# Patient Record
Sex: FEMALE | URBAN - METROPOLITAN AREA
[De-identification: names, ages, dates, MRNs, and addresses within clinical notes are randomized per-mention and may not be internally consistent; named-entity substitution may affect disease eponyms.]

---

## 2021-06-18 ENCOUNTER — APPOINTMENT (RX ONLY)
Dept: URBAN - METROPOLITAN AREA CLINIC 17 | Facility: CLINIC | Age: 31
Setting detail: DERMATOLOGY
End: 2021-06-18

## 2021-06-18 DIAGNOSIS — D22 MELANOCYTIC NEVI: ICD-10-CM

## 2021-06-18 DIAGNOSIS — D18.0 HEMANGIOMA: ICD-10-CM

## 2021-06-18 PROBLEM — D22.61 MELANOCYTIC NEVI OF RIGHT UPPER LIMB, INCLUDING SHOULDER: Status: ACTIVE | Noted: 2021-06-18

## 2021-06-18 PROBLEM — D18.01 HEMANGIOMA OF SKIN AND SUBCUTANEOUS TISSUE: Status: ACTIVE | Noted: 2021-06-18

## 2021-06-18 PROBLEM — D22.39 MELANOCYTIC NEVI OF OTHER PARTS OF FACE: Status: ACTIVE | Noted: 2021-06-18

## 2021-06-18 PROCEDURE — ? COUNSELING

## 2021-06-18 PROCEDURE — 99203 OFFICE O/P NEW LOW 30 MIN: CPT

## 2021-06-18 PROCEDURE — ? DEFER

## 2021-06-18 ASSESSMENT — LOCATION SIMPLE DESCRIPTION DERM
LOCATION SIMPLE: RIGHT PRETIBIAL REGION
LOCATION SIMPLE: LEFT CHEEK
LOCATION SIMPLE: RIGHT SHOULDER

## 2021-06-18 ASSESSMENT — LOCATION DETAILED DESCRIPTION DERM
LOCATION DETAILED: RIGHT PROXIMAL PRETIBIAL REGION
LOCATION DETAILED: LEFT INFERIOR CENTRAL MALAR CHEEK
LOCATION DETAILED: RIGHT ANTERIOR SHOULDER

## 2021-06-18 ASSESSMENT — LOCATION ZONE DERM
LOCATION ZONE: ARM
LOCATION ZONE: LEG
LOCATION ZONE: FACE

## 2021-06-18 NOTE — PROCEDURE: DEFER
Scheduling Instructions (Optional): patient quoted $250 for each mole
Procedure To Be Performed At Next Visit: Shave Removal (Cosmetic)
Detail Level: Detailed
Introduction Text (Please End With A Colon): The following procedure was deferred for the following reason(s):

## 2021-06-18 NOTE — PROCEDURE: COUNSELING
Detail Level: Zone
Detail Level: Simple
Sunscreen Recommendations: Mineral based sunscreens containing zinc and titanium